# Patient Record
Sex: MALE | Race: OTHER | HISPANIC OR LATINO | URBAN - METROPOLITAN AREA
[De-identification: names, ages, dates, MRNs, and addresses within clinical notes are randomized per-mention and may not be internally consistent; named-entity substitution may affect disease eponyms.]

---

## 2017-07-01 ENCOUNTER — EMERGENCY (EMERGENCY)
Facility: HOSPITAL | Age: 32
LOS: 1 days | Discharge: HOME | End: 2017-07-01
Attending: EMERGENCY MEDICINE
Payer: SELF-PAY

## 2017-07-01 VITALS
DIASTOLIC BLOOD PRESSURE: 76 MMHG | SYSTOLIC BLOOD PRESSURE: 123 MMHG | OXYGEN SATURATION: 97 % | RESPIRATION RATE: 18 BRPM | HEART RATE: 76 BPM | HEIGHT: 64.96 IN | WEIGHT: 134.92 LBS | TEMPERATURE: 99 F
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
## 2017-07-01 DIAGNOSIS — S51.812A LACERATION WITHOUT FOREIGN BODY OF LEFT FOREARM, INITIAL ENCOUNTER: ICD-10-CM

## 2017-07-01 PROCEDURE — 99284 EMERGENCY DEPT VISIT MOD MDM: CPT

## 2017-07-01 PROCEDURE — 90471 IMMUNIZATION ADMIN: CPT

## 2017-07-01 PROCEDURE — 73090 X-RAY EXAM OF FOREARM: CPT

## 2017-07-01 PROCEDURE — 99283 EMERGENCY DEPT VISIT LOW MDM: CPT | Mod: 25

## 2017-07-01 PROCEDURE — 90715 TDAP VACCINE 7 YRS/> IM: CPT

## 2017-07-01 PROCEDURE — 73090 X-RAY EXAM OF FOREARM: CPT | Mod: 26,LT

## 2017-07-01 RX ORDER — TETANUS TOXOID, REDUCED DIPHTHERIA TOXOID AND ACELLULAR PERTUSSIS VACCINE, ADSORBED 5; 2.5; 8; 8; 2.5 [IU]/.5ML; [IU]/.5ML; UG/.5ML; UG/.5ML; UG/.5ML
0.5 SUSPENSION INTRAMUSCULAR ONCE
Qty: 0 | Refills: 0 | Status: COMPLETED | OUTPATIENT
Start: 2017-07-01 | End: 2017-07-01

## 2017-07-01 RX ADMIN — Medication 1 TABLET(S): at 16:12

## 2017-07-01 RX ADMIN — TETANUS TOXOID, REDUCED DIPHTHERIA TOXOID AND ACELLULAR PERTUSSIS VACCINE, ADSORBED 0.5 MILLILITER(S): 5; 2.5; 8; 8; 2.5 SUSPENSION INTRAMUSCULAR at 15:47

## 2017-07-01 NOTE — ED ADULT NURSE NOTE - OBJECTIVE STATEMENT
As per  [id number] Patient was cutting vegetables last night and cut his forearm. Patient noted with a 1cm laceration. Denies any fevers or chills

## 2017-07-01 NOTE — ED PROVIDER NOTE - MEDICAL DECISION MAKING DETAILS
laceration, too late to repair will cover. Do not see fb, unlikely abscess, more likely contusion, will check compartment pressure. given that this was a dirty wound and delay to clean will give abx

## 2017-07-01 NOTE — ED PROVIDER NOTE - PROGRESS NOTE DETAILS
compartment pressure measured at sight of maximal pain is 4mm. Pt remains comfortable and unlikely to have any other compartment with increased pressure and no abscess.

## 2017-07-01 NOTE — ED PROVIDER NOTE - SKIN WOUND DESCRIPTION
1 cm laceration with minimal swelling, no redness, no erythema, mild pain with passive stretch, though not as much as would be expected with compartment syndrome. normal distal pulses, no tenderness along rest of muscle.

## 2017-07-01 NOTE — ED PROVIDER NOTE - OBJECTIVE STATEMENT
ID: [id number]    33 y/o M with no significant PMHx presents to ED c/o laceration to R forerarm x several days. Pt states that he was cutting vegetables when he turned around,  ID: [id number]    33 y/o M with no significant PMHx presents to ED c/o laceration to R forearm x several days. Pt states that he was cutting vegetables when he turned around, hit a wall and cut himself. Pt is here today because of increased swelling at site, was suggested to come to ED from work. Pt confirms pain and discharge at site. Denies fever, chills or any other complaints. NKDA.  ID: [id number]    33 y/o M with no significant PMHx presents to ED c/o laceration to R forearm x 1  day. Pt states that he was cutting vegetables last night when he turned around, hit a wall and cut himself. Pt is here today because of increased swelling at site, was suggested to come to ED from work. Pt confirms pain and and bloody discharge at site. Denies fever, chills or any other complaints. NKDA.

## 2017-08-12 ENCOUNTER — EMERGENCY (EMERGENCY)
Facility: HOSPITAL | Age: 32
LOS: 1 days | Discharge: HOME | End: 2017-08-12
Attending: EMERGENCY MEDICINE
Payer: SELF-PAY

## 2017-08-12 VITALS
SYSTOLIC BLOOD PRESSURE: 130 MMHG | OXYGEN SATURATION: 97 % | DIASTOLIC BLOOD PRESSURE: 91 MMHG | RESPIRATION RATE: 16 BRPM | WEIGHT: 139.99 LBS | HEART RATE: 86 BPM | TEMPERATURE: 99 F

## 2017-08-12 VITALS
DIASTOLIC BLOOD PRESSURE: 85 MMHG | OXYGEN SATURATION: 98 % | RESPIRATION RATE: 18 BRPM | TEMPERATURE: 99 F | HEART RATE: 82 BPM | SYSTOLIC BLOOD PRESSURE: 132 MMHG

## 2017-08-12 DIAGNOSIS — J02.9 ACUTE PHARYNGITIS, UNSPECIFIED: ICD-10-CM

## 2017-08-12 PROCEDURE — 99284 EMERGENCY DEPT VISIT MOD MDM: CPT

## 2017-08-12 RX ORDER — SODIUM CHLORIDE 9 MG/ML
1000 INJECTION INTRAMUSCULAR; INTRAVENOUS; SUBCUTANEOUS ONCE
Qty: 0 | Refills: 0 | Status: COMPLETED | OUTPATIENT
Start: 2017-08-12 | End: 2017-08-12

## 2017-08-12 RX ORDER — DEXAMETHASONE 0.5 MG/5ML
8 ELIXIR ORAL ONCE
Qty: 0 | Refills: 0 | Status: COMPLETED | OUTPATIENT
Start: 2017-08-12 | End: 2017-08-12

## 2017-08-12 RX ORDER — KETOROLAC TROMETHAMINE 30 MG/ML
30 SYRINGE (ML) INJECTION ONCE
Qty: 0 | Refills: 0 | Status: DISCONTINUED | OUTPATIENT
Start: 2017-08-12 | End: 2017-08-12

## 2017-08-12 RX ADMIN — Medication 8 MILLIGRAM(S): at 17:59

## 2017-08-12 RX ADMIN — Medication 30 MILLIGRAM(S): at 17:59

## 2017-08-12 RX ADMIN — SODIUM CHLORIDE 4000 MILLILITER(S): 9 INJECTION INTRAMUSCULAR; INTRAVENOUS; SUBCUTANEOUS at 18:00

## 2017-08-12 RX ADMIN — Medication 30 MILLIGRAM(S): at 19:09

## 2017-08-12 NOTE — ED ADULT NURSE NOTE - OBJECTIVE STATEMENT
arrived ambulatory c/o throat  pain and difficulty speaking x 1 day, denies fever/ chills, cough, SOB.  no acute distress noted

## 2017-08-12 NOTE — ED PROVIDER NOTE - CONDUCTED A DETAILED DISCUSSION WITH PATIENT AND/OR GUARDIAN REGARDING, MDM
need for outpatient follow-up need for outpatient follow-up/return to ED if symptoms worsen, persist or questions arise

## 2017-08-12 NOTE — ED PROVIDER NOTE - OBJECTIVE STATEMENT
Physician as . 33 y/o M pt with no PMHx and no PSHx presents to ED c/o throat inflammation with associated difficulty speaking and difficulty breathing x1 days. Pt denies fever, chills, cough, vomiting, or any other complaints. NKDA. Physician as . 33 y/o M pt with no PMHx and no PSHx presents to ED c/o throat inflammation with associated difficulty speaking x 1 day. Pt denies fever, chills, cough, vomiting, or any other complaints. NKDA.

## 2017-08-12 NOTE — ED PROVIDER NOTE - MEDICAL DECISION MAKING DETAILS
pharyngitis, likely viral. no exudates, no fever  improved with meds. supportive care at home. discussed anticipatory guidance.

## 2021-12-14 NOTE — ED ADULT NURSE NOTE - FINAL NURSING ELECTRONIC SIGNATURE
[address], [place]  [facility]. [place] [address]  Phone: [phone]  Fax: [phone]    LENARD Tyson CNP        December 15, 2021     Patient: [facility]   YOB: 2002   Date of Visit: 12/14/2021       To Whom it May Concern:    Forrest Dubin was seen in my clinic on 12/14/2021. He may return to school on 12/15/2021. If you have any questions or concerns, please don't hesitate to call.     Sincerely,         LENARD Tyson CNP 12-Aug-2017 19:19

## 2022-05-01 NOTE — ED ADULT NURSE NOTE - CAS EDN DISCHARGE ASSESSMENT
Patient requests all Lab, Cardiology, and Radiology Results on their Discharge Instructions Alert and oriented to person, place and time

## 2025-06-03 NOTE — ED ADULT NURSE NOTE - FALLEN IN THE PAST
Prior Authorization Retail Medication Request    Medication/Dose:   tirzepatide-Weight Management (ZEPBOUND) 2.5 MG/0.5ML prefilled pen  1 ordered       2.5 mg, EVERY 7 DAYS        Diagnosis and ICD code (if different than what is on RX):    New/renewal/insurance change PA/secondary ins. PA:  Previously Tried and Failed:    Rationale:  insurance requires a PA    Insurance   Primary: [employer]  Insurance ID:  [id number]    Secondary (if applicable):  Insurance ID:      Pharmacy Information (if different than what is on RX)  Name:  [facility]  Phone:    Fax:    Clinic Information  Preferred routing pool for dept communication: [facility] PRIMARY [facility]ARE CLIN[facility] POOL   
no